# Patient Record
Sex: FEMALE | Race: WHITE | NOT HISPANIC OR LATINO | URBAN - METROPOLITAN AREA
[De-identification: names, ages, dates, MRNs, and addresses within clinical notes are randomized per-mention and may not be internally consistent; named-entity substitution may affect disease eponyms.]

---

## 2017-06-26 ENCOUNTER — RECORDS - HEALTHEAST (OUTPATIENT)
Dept: LAB | Facility: CLINIC | Age: 57
End: 2017-06-26

## 2017-06-26 LAB
CHOLEST SERPL-MCNC: 176 MG/DL
FASTING STATUS PATIENT QL REPORTED: NORMAL
HDLC SERPL-MCNC: 58 MG/DL
LDLC SERPL CALC-MCNC: 100 MG/DL
TRIGL SERPL-MCNC: 91 MG/DL

## 2017-09-18 ENCOUNTER — OFFICE VISIT - HEALTHEAST (OUTPATIENT)
Dept: CARDIOLOGY | Facility: CLINIC | Age: 57
End: 2017-09-18

## 2017-09-18 DIAGNOSIS — R07.89 CHEST TIGHTNESS: ICD-10-CM

## 2017-09-18 DIAGNOSIS — R01.1 HEART MURMUR: ICD-10-CM

## 2017-09-18 DIAGNOSIS — E78.00 HYPERCHOLESTEROLEMIA: ICD-10-CM

## 2017-09-18 ASSESSMENT — MIFFLIN-ST. JEOR: SCORE: 1472.61

## 2017-10-02 ENCOUNTER — HOSPITAL ENCOUNTER (OUTPATIENT)
Dept: NUCLEAR MEDICINE | Facility: CLINIC | Age: 57
Discharge: HOME | End: 2017-10-02
Attending: INTERNAL MEDICINE

## 2017-10-02 ENCOUNTER — HOSPITAL ENCOUNTER (OUTPATIENT)
Dept: CARDIOLOGY | Facility: CLINIC | Age: 57
Discharge: HOME | End: 2017-10-02
Attending: INTERNAL MEDICINE

## 2017-10-02 DIAGNOSIS — E78.00 HYPERCHOLESTEROLEMIA: ICD-10-CM

## 2017-10-02 DIAGNOSIS — R07.89 CHEST TIGHTNESS: ICD-10-CM

## 2017-10-02 DIAGNOSIS — R01.1 HEART MURMUR: ICD-10-CM

## 2017-10-02 LAB
AORTIC ROOT: 3.6 CM
AORTIC VALVE MEAN VELOCITY: 121 CM/S
AR DECEL SLOPE: 3000 MM/S2
AR PEAK VELOCITY: 484 CM/S
AV DIMENSIONLESS INDEX VTI: 0.8
AV MEAN GRADIENT: 7 MMHG
AV PEAK GRADIENT: 11.4 MMHG
AV REGURGITANT PEAK GRADIENT: 93.7 MMHG
AV REGURGITATION PRESSURE HALF TIME: 473 MS
AV VALVE AREA: 2.4 CM2
AV VELOCITY RATIO: 0.8
BSA FOR ECHO PROCEDURE: 2.05 M2
CV BLOOD PRESSURE: NORMAL MMHG
CV ECHO HEIGHT: 61 IN
CV ECHO WEIGHT: 215 LBS
CV STRESS CURRENT BP HE: NORMAL
CV STRESS CURRENT HR HE: 101
CV STRESS CURRENT HR HE: 103
CV STRESS CURRENT HR HE: 107
CV STRESS CURRENT HR HE: 107
CV STRESS CURRENT HR HE: 67
CV STRESS CURRENT HR HE: 72
CV STRESS CURRENT HR HE: 76
CV STRESS CURRENT HR HE: 77
CV STRESS CURRENT HR HE: 81
CV STRESS CURRENT HR HE: 82
CV STRESS CURRENT HR HE: 85
CV STRESS CURRENT HR HE: 85
CV STRESS CURRENT HR HE: 89
CV STRESS CURRENT HR HE: 90
CV STRESS CURRENT HR HE: 97
CV STRESS CURRENT HR HE: 97
CV STRESS DEVIATION TIME HE: NORMAL
CV STRESS ECHO PERCENT HR HE: NORMAL
CV STRESS EXERCISE STAGE HE: NORMAL
CV STRESS FINAL RESTING BP HE: NORMAL
CV STRESS FINAL RESTING HR HE: 81
CV STRESS MAX HR HE: 108
CV STRESS MAX TREADMILL GRADE HE: 0
CV STRESS MAX TREADMILL SPEED HE: 1
CV STRESS PEAK DIA BP HE: NORMAL
CV STRESS PEAK SYS BP HE: NORMAL
CV STRESS PHASE HE: NORMAL
CV STRESS PROTOCOL HE: NORMAL
CV STRESS RESTING PT POSITION HE: NORMAL
CV STRESS ST DEVIATION AMOUNT HE: NORMAL
CV STRESS ST DEVIATION ELEVATION HE: NORMAL
CV STRESS ST EVELATION AMOUNT HE: NORMAL
CV STRESS TEST TYPE HE: NORMAL
CV STRESS TOTAL STAGE TIME MIN 1 HE: NORMAL
DOP CALC AO PEAK VEL: 169 CM/S
DOP CALC AO VTI: 41.2 CM
DOP CALC LVOT AREA: 3.14 CM2
DOP CALC LVOT DIAMETER: 2 CM
DOP CALC LVOT PEAK VEL: 134 CM/S
DOP CALC LVOT STROKE VOLUME: 98 CM3
DOP CALCLVOT PEAK VEL VTI: 31.2 CM
EJECTION FRACTION: 63 % (ref 55–75)
FRACTIONAL SHORTENING: 37 % (ref 28–44)
INTERVENTRICULAR SEPTUM IN END DIASTOLE: 1.22 CM (ref 0.6–0.9)
IVS/PW RATIO: 1
LA AREA 1: 20.6 CM2
LA AREA 2: 19.7 CM2
LEFT ATRIUM LENGTH: 5.39 CM
LEFT ATRIUM SIZE: 3.7 CM
LEFT ATRIUM TO AORTIC ROOT RATIO: 1.03 NO UNITS
LEFT ATRIUM VOLUME INDEX: 31.2 ML/M2
LEFT ATRIUM VOLUME: 64 CM3
LEFT VENTRICLE CARDIAC INDEX: 2.9 L/MIN/M2
LEFT VENTRICLE CARDIAC OUTPUT: 5.9 L/MIN
LEFT VENTRICLE DIASTOLIC VOLUME INDEX: 46.8 CM3/M2 (ref 34–74)
LEFT VENTRICLE DIASTOLIC VOLUME: 96 CM3 (ref 46–106)
LEFT VENTRICLE HEART RATE: 60 BPM
LEFT VENTRICLE MASS INDEX: 116.9 G/M2
LEFT VENTRICLE SYSTOLIC VOLUME INDEX: 17.6 CM3/M2 (ref 11–31)
LEFT VENTRICLE SYSTOLIC VOLUME: 36 CM3 (ref 14–42)
LEFT VENTRICULAR INTERNAL DIMENSION IN DIASTOLE: 5.06 CM (ref 3.8–5.2)
LEFT VENTRICULAR INTERNAL DIMENSION IN SYSTOLE: 3.19 CM (ref 2.2–3.5)
LEFT VENTRICULAR MASS: 239.6 G
LEFT VENTRICULAR OUTFLOW TRACT MEAN GRADIENT: 3 MMHG
LEFT VENTRICULAR OUTFLOW TRACT MEAN VELOCITY: 85.3 CM/S
LEFT VENTRICULAR OUTFLOW TRACT PEAK GRADIENT: 7 MMHG
LEFT VENTRICULAR POSTERIOR WALL IN END DIASTOLE: 1.19 CM (ref 0.6–0.9)
LV STROKE VOLUME INDEX: 47.8 ML/M2
MITRAL VALVE E/A RATIO: 1.3
MV AVERAGE E/E' RATIO: 11 CM/S
MV DECELERATION TIME: 183 MS
MV E'TISSUE VEL-LAT: 10.3 CM/S
MV E'TISSUE VEL-MED: 8.48 CM/S
MV LATERAL E/E' RATIO: 10
MV MEDIAL E/E' RATIO: 12.1
MV PEAK A VELOCITY: 80.6 CM/S
MV PEAK E VELOCITY: 103 CM/S
NUC REST DIASTOLIC VOLUME INDEX: 3440 LBS
NUC REST SYSTOLIC VOLUME INDEX: 61 IN
NUC STRESS EJECTION FRACTION: 67 %
RIGHT VENTRICULAR INTERNAL DIMENSION IN DYSTOLE: 2.47 CM
STRESS ECHO BASELINE BP: NORMAL
STRESS ECHO BASELINE HR: 80
STRESS ECHO CALCULATED PERCENT HR: 67 %
STRESS ECHO LAST STRESS BP: NORMAL
STRESS ECHO LAST STRESS HR: 97

## 2017-10-02 ASSESSMENT — MIFFLIN-ST. JEOR: SCORE: 1472.61

## 2017-10-05 ENCOUNTER — COMMUNICATION - HEALTHEAST (OUTPATIENT)
Dept: CARDIOLOGY | Facility: CLINIC | Age: 57
End: 2017-10-05

## 2017-10-19 ENCOUNTER — AMBULATORY - HEALTHEAST (OUTPATIENT)
Dept: CARDIOLOGY | Facility: CLINIC | Age: 57
End: 2017-10-19

## 2017-10-19 DIAGNOSIS — I35.1 AORTIC INSUFFICIENCY: ICD-10-CM

## 2018-04-30 ENCOUNTER — HOSPITAL ENCOUNTER (OUTPATIENT)
Dept: MAMMOGRAPHY | Facility: CLINIC | Age: 58
Discharge: HOME | End: 2018-04-30
Attending: FAMILY MEDICINE

## 2018-04-30 DIAGNOSIS — Z12.31 VISIT FOR SCREENING MAMMOGRAM: ICD-10-CM

## 2018-10-26 ENCOUNTER — RECORDS - HEALTHEAST (OUTPATIENT)
Dept: LAB | Facility: CLINIC | Age: 58
End: 2018-10-26

## 2018-10-26 LAB
ALBUMIN SERPL-MCNC: 4.2 G/DL (ref 3.5–5)
ALP SERPL-CCNC: 97 U/L (ref 45–120)
ALT SERPL W P-5'-P-CCNC: 28 U/L (ref 0–45)
ANION GAP SERPL CALCULATED.3IONS-SCNC: 9 MMOL/L (ref 5–18)
AST SERPL W P-5'-P-CCNC: 27 U/L (ref 0–40)
BILIRUB SERPL-MCNC: 0.2 MG/DL (ref 0–1)
BUN SERPL-MCNC: 15 MG/DL (ref 8–22)
CALCIUM SERPL-MCNC: 10.3 MG/DL (ref 8.5–10.5)
CHLORIDE BLD-SCNC: 107 MMOL/L (ref 98–107)
CHOLEST SERPL-MCNC: 192 MG/DL
CO2 SERPL-SCNC: 26 MMOL/L (ref 22–31)
CREAT SERPL-MCNC: 0.78 MG/DL (ref 0.6–1.1)
FASTING STATUS PATIENT QL REPORTED: YES
GFR SERPL CREATININE-BSD FRML MDRD: >60 ML/MIN/1.73M2
GLUCOSE BLD-MCNC: 108 MG/DL (ref 70–125)
HDLC SERPL-MCNC: 74 MG/DL
LDLC SERPL CALC-MCNC: 105 MG/DL
POTASSIUM BLD-SCNC: 4.6 MMOL/L (ref 3.5–5)
PROT SERPL-MCNC: 6.6 G/DL (ref 6–8)
SODIUM SERPL-SCNC: 142 MMOL/L (ref 136–145)
TRIGL SERPL-MCNC: 66 MG/DL

## 2018-11-28 ENCOUNTER — RECORDS - HEALTHEAST (OUTPATIENT)
Dept: LAB | Facility: CLINIC | Age: 58
End: 2018-11-28

## 2018-11-28 LAB
ANION GAP SERPL CALCULATED.3IONS-SCNC: 10 MMOL/L (ref 5–18)
BUN SERPL-MCNC: 17 MG/DL (ref 8–22)
CALCIUM SERPL-MCNC: 10.1 MG/DL (ref 8.5–10.5)
CHLORIDE BLD-SCNC: 106 MMOL/L (ref 98–107)
CO2 SERPL-SCNC: 25 MMOL/L (ref 22–31)
CREAT SERPL-MCNC: 0.84 MG/DL (ref 0.6–1.1)
GFR SERPL CREATININE-BSD FRML MDRD: >60 ML/MIN/1.73M2
GLUCOSE BLD-MCNC: 144 MG/DL (ref 70–125)
POTASSIUM BLD-SCNC: 4.3 MMOL/L (ref 3.5–5)
SODIUM SERPL-SCNC: 141 MMOL/L (ref 136–145)

## 2019-03-18 LAB
HPV SOURCE: NORMAL
HUMAN PAPILLOMA VIRUS 16 DNA: NEGATIVE
HUMAN PAPILLOMA VIRUS 18 DNA: NEGATIVE
HUMAN PAPILLOMA VIRUS FINAL DIAGNOSIS: NORMAL
HUMAN PAPILLOMA VIRUS OTHER HR: NEGATIVE
SPECIMEN DESCRIPTION: NORMAL

## 2019-03-20 ENCOUNTER — RECORDS - HEALTHEAST (OUTPATIENT)
Dept: ADMINISTRATIVE | Facility: OTHER | Age: 59
End: 2019-03-20

## 2019-03-20 LAB
BKR LAB AP ABNORMAL BLEEDING: NO
BKR LAB AP BIRTH CONTROL/HORMONES: NORMAL
BKR LAB AP CERVICAL APPEARANCE: NORMAL
BKR LAB AP GYN ADEQUACY: NORMAL
BKR LAB AP GYN INTERPRETATION: NORMAL
BKR LAB AP HPV REFLEX: NORMAL
BKR LAB AP LMP: 2009
BKR LAB AP PATIENT STATUS: NORMAL
BKR LAB AP PREVIOUS ABNORMAL: 2011
BKR LAB AP PREVIOUS NORMAL: NORMAL
HIGH RISK?: NO
PATH REPORT.COMMENTS IMP SPEC: NORMAL
RESULT FLAG (HE HISTORICAL CONVERSION): NORMAL

## 2019-09-23 ENCOUNTER — RECORDS - HEALTHEAST (OUTPATIENT)
Dept: LAB | Facility: CLINIC | Age: 59
End: 2019-09-23

## 2019-09-23 LAB
ANION GAP SERPL CALCULATED.3IONS-SCNC: 10 MMOL/L (ref 5–18)
BUN SERPL-MCNC: 19 MG/DL (ref 8–22)
CALCIUM SERPL-MCNC: 9.6 MG/DL (ref 8.5–10.5)
CHLORIDE BLD-SCNC: 104 MMOL/L (ref 98–107)
CHOLEST SERPL-MCNC: 144 MG/DL
CO2 SERPL-SCNC: 25 MMOL/L (ref 22–31)
CREAT SERPL-MCNC: 0.89 MG/DL (ref 0.6–1.1)
FASTING STATUS PATIENT QL REPORTED: NO
GFR SERPL CREATININE-BSD FRML MDRD: >60 ML/MIN/1.73M2
GLUCOSE BLD-MCNC: 109 MG/DL (ref 70–125)
HDLC SERPL-MCNC: 55 MG/DL
LDLC SERPL CALC-MCNC: 73 MG/DL
POTASSIUM BLD-SCNC: 4.7 MMOL/L (ref 3.5–5)
SODIUM SERPL-SCNC: 139 MMOL/L (ref 136–145)
TRIGL SERPL-MCNC: 79 MG/DL

## 2021-05-25 ENCOUNTER — RECORDS - HEALTHEAST (OUTPATIENT)
Dept: ADMINISTRATIVE | Facility: CLINIC | Age: 61
End: 2021-05-25

## 2021-05-29 ENCOUNTER — RECORDS - HEALTHEAST (OUTPATIENT)
Dept: ADMINISTRATIVE | Facility: CLINIC | Age: 61
End: 2021-05-29

## 2021-05-31 VITALS — HEIGHT: 61 IN | BODY MASS INDEX: 40.59 KG/M2 | WEIGHT: 215 LBS

## 2021-05-31 VITALS — HEIGHT: 61 IN | WEIGHT: 215 LBS | BODY MASS INDEX: 40.59 KG/M2

## 2021-06-13 NOTE — PROGRESS NOTES
Consultation - [facility]  Loren Dunham,  1960, MRN [id number]    PCP: Todd Gamble MD, [phone]    Assessment and Plan: Cardiac murmur.  Chest tightness.  Hyperlipidemia.  Recommendations: Arrange for nuclear stress study and cardiac echo    Chief Complaint: Chest tightness    HPI:  We have been requested by above to evaluate Loren Dunham for consultation who is a  56 y.o. year old female for Dr. Gamble.   Hx: 56-year-old woman with no previous cardiac history.  She states that 5-7 years ago she had some type of stress test that was normal.  She never had any history of chest pains until recently when she experienced 2 episodes of a tightness over the left anterior chest when occurred while she was weight lifting this is the form of exercise she enjoys most.  No dizziness lightheadedness palpitations syncope dyspnea edema.  She has a history of hypercholesterolemia on simvastatin.  No history of hypertension diabetes tobacco use or family history of early onset heart disease.    Medical History  Active Ambulatory (Non-Hospital) Problems    Diagnosis     Chest tightness     Hypercholesterolemia     Heart murmur     No past medical history on file.    Surgical History  She  has a past surgical history that includes Breast cyst excision (Left).    Social History  Reviewed, and she  reports that she quit smoking about 15 years ago. Her smoking use included Cigarettes. She smoked 3.00 packs per day. She does not have any smokeless tobacco history on file. She reports that she does not drink alcohol.  Smoking status reviewed.  Social history othrwise not contributory to HPI.  Allergies  Allergies   Allergen Reactions     Clonazepam Unknown     Diphenhydramine Unknown     Effexor [Venlafaxine] Unknown     Geodon [Ziprasidone Hcl] Unknown     Lithobid [Lithium Carbonate] Unknown     Seroquel [Quetiapine] Unknown     Thiothixene Unknown     Wellbutrin [Bupropion Hcl] Unknown      "Zyprexa [Olanzapine]      Unknown reaction, pt states \"It doesn't work for me.\"        Family History  Reviewed, and family history is not on file.  Extended Emergency Contact Information  Primary Emergency Contact: Ez Dunham  Address: [address] [address]           [place] [address] [facility]  Home Phone: [phone]  Relation: Spouse  Family history otherwise negative or not conributory to HPI.    Psychosocial Needs  Social History     Social History Narrative     Additional psychosocial needs reviewed per nursing assessment.    Prior to Admission Medications    (Not in a hospital admission)    Review of Systems:  A 12 point comprehensive review of systems was negative except as noted.  Review of systems is negative except for HPI  Physical Exam:  Less than 10 mL of urine    /72 (Patient Site: Left Arm, Patient Position: Sitting, Cuff Size: Adult Large)  Pulse 68  Resp 12  Ht 5' 1\" (1.549 m)  Wt 215 lb (97.5 kg)  BMI 40.62 kg/m2  General appearance: alert, appears stated age and cooperative  Adult female sitting upright no acute distress.  Head and neck without focal cranial neurologic defect.  Anicteric sclera.'s skin warm and dry.  No peripheral edema.  Pulses full and equal.  JVD flat.  Carotid obstruction without bruit.  No cervical lymphadenopathy or thyromegaly.  Breath sounds clear.  Heart tones S1-S2 normal distinct with a 2/6 murmur heard diffusely through the chest crescendo decrescendo radiating to the upper right sternal border.  No S3 or S4.  Epigastric bowel tones normal.    Pertinent Labs  Lab Results: personally reviewed.   Admission on 09/13/2017, Discharged on 09/13/2017   Component Date Value     SYSTOLIC BLOOD PRESSURE 09/13/2017 158      DIASTOLIC BLOOD PRESSURE 09/13/2017 80      VENTRICULAR RATE 09/13/2017 70      ATRIAL RATE 09/13/2017 70      P-R INTERVAL 09/13/2017 166      QRS DURATION 09/13/2017 82      Q-T INTERVAL 09/13/2017 382      QTC CALCULATION (BEZET) " 09/13/2017 412      P Axis 09/13/2017 54      R AXIS 09/13/2017 48      T AXIS 09/13/2017 61      MUSE DIAGNOSIS 09/13/2017                      Value:Normal sinus rhythm  Cannot rule out Anterior infarct , age undetermined  Abnormal ECG  When compared with ECG of 25-MAR-2017 09:41,  No significant change was found  Confirmed by SOO PAYNE MD LOC: ([id number]) on 9/13/2017 3:16:37 PM       INR 09/13/2017 1.00      PTT 09/13/2017 28      Troponin I 09/13/2017 <0.01      Lipase 09/13/2017 17      Bilirubin, Total 09/13/2017 0.3      Bilirubin, Direct 09/13/2017 <0.1      Protein, Total 09/13/2017 6.8      Albumin 09/13/2017 4.0      Alkaline Phosphatase 09/13/2017 98      AST 09/13/2017 16      ALT 09/13/2017 20      Sodium 09/13/2017 140      Potassium 09/13/2017 4.0      Chloride 09/13/2017 106      CO2 09/13/2017 24      Anion Gap, Calculation 09/13/2017 10      Glucose 09/13/2017 140*     Calcium 09/13/2017 10.1      BUN 09/13/2017 15      Creatinine 09/13/2017 0.86      GFR MDRD Af Amer 09/13/2017 >60      GFR MDRD Non Af Amer 09/13/2017 >60      WBC 09/13/2017 6.5      RBC 09/13/2017 4.51      Hemoglobin 09/13/2017 12.2      Hematocrit 09/13/2017 37.0      MCV 09/13/2017 82      MCH 09/13/2017 27.1      MCHC 09/13/2017 33.0      RDW 09/13/2017 13.5      Platelets 09/13/2017 227      MPV 09/13/2017 10.0      Neutrophils % 09/13/2017 73*     Lymphocytes % 09/13/2017 17*     Monocytes % 09/13/2017 9      Eosinophils % 09/13/2017 1      Basophils % 09/13/2017 1      Neutrophils Absolute 09/13/2017 4.7      Lymphocytes Absolute 09/13/2017 1.1      Monocytes Absolute 09/13/2017 0.6      Eosinophils Absolute 09/13/2017 0.0      Basophils Absolute 09/13/2017 0.0      Troponin I 09/13/2017 <0.01      SYSTOLIC BLOOD PRESSURE 09/13/2017 121      DIASTOLIC BLOOD PRESSURE 09/13/2017 58      VENTRICULAR RATE 09/13/2017 65      ATRIAL RATE 09/13/2017 65      P-R INTERVAL 09/13/2017 180      QRS DURATION 09/13/2017 86       Q-T INTERVAL 09/13/2017 398      QTC CALCULATION (BEZET) 09/13/2017 413      P [place] 09/13/2017 48      R AXIS 09/13/2017 48      T AXIS 09/13/2017 66      MUSE DIAGNOSIS 09/13/2017                      Value:Normal sinus rhythm  Normal ECG  When compared with ECG of 13-SEP-2017 09:34,  No significant change was found  Confirmed by ELMER FOREMAN, SOO LOC: ([id number]) on 9/13/2017 3:15:06 PM         Pertinent Radiology  Radiology Results: See Report  EKG Results: See Report

## 2021-06-16 PROBLEM — R01.1 HEART MURMUR: Status: ACTIVE | Noted: 2017-09-18

## 2021-06-16 PROBLEM — R07.89 CHEST TIGHTNESS: Status: ACTIVE | Noted: 2017-09-18

## 2021-06-16 PROBLEM — E78.00 HYPERCHOLESTEROLEMIA: Status: ACTIVE | Noted: 2017-09-18

## 2021-07-13 ENCOUNTER — RECORDS - HEALTHEAST (OUTPATIENT)
Dept: ADMINISTRATIVE | Facility: CLINIC | Age: 61
End: 2021-07-13

## 2021-07-21 ENCOUNTER — RECORDS - HEALTHEAST (OUTPATIENT)
Dept: ADMINISTRATIVE | Facility: CLINIC | Age: 61
End: 2021-07-21